# Patient Record
(demographics unavailable — no encounter records)

---

## 2025-06-11 NOTE — ASSESSMENT
[FreeTextEntry1] : 70y/o female  1-  COPD emphysema     -       URI exacerbation  2-  > 20 year ex smoker ct 3/23  emphysema  3-  BMI 17.75 4-  vaccinations per primary refuses pneumonia shot discussed  Recommendations 1- trelegy     200 qd    to continue  2- albuterol MDI rescue 2 inh q 4 hour prn 3-  PFT  4-  cxr 5-    screening ct after acute exacerbation  6- sputum culture 7- RVP 8-  z kyler then tiw 9- sputum sample  discussion and reviewed above   r

## 2025-06-11 NOTE — REVIEW OF SYSTEMS
[Fever] : no fever [Fatigue] : no fatigue [Recent Wt Gain (___ Lbs)] : ~T no recent weight gain [Chills] : no chills [Recent Wt Loss (___ Lbs)] : ~T no recent weight loss [Dry Eyes] : no dry eyes [Sore Throat] : no sore throat [Dry Mouth] : no dry mouth [Cough] : no cough [Hemoptysis] : no hemoptysis [Sputum] : no sputum [Dyspnea] : no dyspnea [Wheezing] : no wheezing [SOB on Exertion] : no sob on exertion [Chest Discomfort] : no chest discomfort [Palpitations] : no palpitations [GERD] : no gerd [Abdominal Pain] : no abdominal pain [Nausea] : no nausea [Vomiting] : no vomiting [Dysphagia] : no dysphagia [Bleeding] : no bleeding [Myalgias] : no myalgias [Chronic Pain] : no chronic pain [Rash] : no rash [Blood Transfusion] : no blood transfusion [Clotting Disorder/ Frequent bleeding] : no clotting disorder/ frequent bleeding [Diabetes] : no diabetes [Thyroid Problem] : no thyroid problem [Obesity] : no obesity

## 2025-06-11 NOTE — HISTORY OF PRESENT ILLNESS
[Former] : former [>= 20 pack years] : >= 20 pack years [Never] : never [TextBox_4] : 68y/o  female   born in [place] ex smoker ( 16 one pack day  - quit 2009)   work  ( sprays)   here for follo w up   COPD - covid  2020    was    severe  3 weeks  -now on  trelegy  100  doing well  - albuterol MDI as needed - no chest pain  no sob  no wheezing   7/12/2023 68y/o  born  in [place]  ex smoker  COPD - increase in cough  6/2023  went to [facility]   took  pills    then  back to [facility]  bronchitis  - took trelegy   prednisone  cefuroxime -currently still with   phlegm  and off steroids    finishing  7days cefuroxime -  8/23/2023 68y/o   female born in [place] COPD f/u exacerbation  - imrpoved    azithromycin  tiw  thinks mucinex  helped as well  - trelegy    dialy doing well  -  12/1/2023 68y/o female    COPD    exacerbation URI  - cough green phelgm  no fever - x two days  -on azithromycin  -  12/8/2023 68y/o    female  born in [place]  COPD    exeacerabion  - now   s/p ED visit   increase in steroids  due to  bronchospasm - prednisone    bid  (  called and increased   after our discsssion)  - azithro  tiw - now  clearing of phlegm -trelegy 200 qd - 1/2/2024 68y/o  female  born in [place]  COPD     - was sick  - finished    prednisone  - now on trelegy  100 qd - some  sob   - 6/11/2025  did not follow up as   discussed  felt good  70y/o   female  born in [facility]  ex smoker  COPD exacerbation  - trelegy  doing well  - sick   cough    took   azithromycin     now yellow  - took prednisone 20 mgpo qd  now few days  still with cough - has benzonatate  requesting more - cannot expectorate -  [TextBox_11] : 1 [TextBox_13] : 39 [YearQuit] : 2009

## 2025-06-11 NOTE — PHYSICAL EXAM
[IV] : Mallampati Class: IV [Normal Appearance] : normal appearance [Supple] : supple [No Neck Mass] : no neck mass [No JVD] : no jvd [Normal Rate/Rhythm] : normal rate/rhythm [Normal S1, S2] : normal s1, s2 [No Murmurs] : no murmurs [No Resp Distress] : no resp distress [No Acc Muscle Use] : no acc muscle use [Benign] : benign [Not Tender] : not tender [No Masses] : no masses [Soft] : soft [No Hernias] : no hernias [Normal Bowel Sounds] : normal bowel sounds [Normal Gait] : normal gait [No Clubbing] : no clubbing [No Edema] : no edema [No Rash] : no rash [No Motor Deficits] : no motor deficits [Normal Affect] : normal affect [TextBox_2] : pleasant f  bronchial cough  [TextBox_11] : no lesion moist  [TextBox_68] : bronchial cough   wheezing

## 2025-07-30 NOTE — HISTORY OF PRESENT ILLNESS
[Former] : Former [TextBox_13] : Patient is scheduled for a annual  LDCT for lung cancer screening. Chart review performed to confirm eligibility for LDCT for the purposes of [facility] lung cancer screening program. Unable to reach pt for SDM.    No documented personal history of lung cancer. No documented s/s of lung cancer. Pt is a former smoker with a 39 pack year hx. [PacksperYear] : 39